# Patient Record
Sex: FEMALE
[De-identification: names, ages, dates, MRNs, and addresses within clinical notes are randomized per-mention and may not be internally consistent; named-entity substitution may affect disease eponyms.]

---

## 2022-10-31 ENCOUNTER — APPOINTMENT (OUTPATIENT)
Dept: ANTEPARTUM | Facility: CLINIC | Age: 32
End: 2022-10-31

## 2022-10-31 ENCOUNTER — ASOB RESULT (OUTPATIENT)
Age: 32
End: 2022-10-31

## 2022-10-31 PROBLEM — Z00.00 ENCOUNTER FOR PREVENTIVE HEALTH EXAMINATION: Status: ACTIVE | Noted: 2022-10-31

## 2022-10-31 PROCEDURE — 76813 OB US NUCHAL MEAS 1 GEST: CPT

## 2022-12-13 ENCOUNTER — APPOINTMENT (OUTPATIENT)
Dept: ANTEPARTUM | Facility: CLINIC | Age: 32
End: 2022-12-13

## 2022-12-13 ENCOUNTER — ASOB RESULT (OUTPATIENT)
Age: 32
End: 2022-12-13

## 2022-12-13 PROCEDURE — 76811 OB US DETAILED SNGL FETUS: CPT

## 2022-12-19 ENCOUNTER — APPOINTMENT (OUTPATIENT)
Dept: ANTEPARTUM | Facility: CLINIC | Age: 32
End: 2022-12-19

## 2023-02-17 ENCOUNTER — ASOB RESULT (OUTPATIENT)
Age: 33
End: 2023-02-17

## 2023-02-17 ENCOUNTER — APPOINTMENT (OUTPATIENT)
Dept: MATERNAL FETAL MEDICINE | Facility: CLINIC | Age: 33
End: 2023-02-17
Payer: COMMERCIAL

## 2023-02-17 DIAGNOSIS — O24.419 GESTATIONAL DIABETES MELLITUS IN PREGNANCY, UNSPECIFIED CONTROL: ICD-10-CM

## 2023-02-17 PROCEDURE — G0109 DIAB MANAGE TRN IND/GROUP: CPT | Mod: 95

## 2023-02-17 RX ORDER — LANCETS 33 GAUGE
EACH MISCELLANEOUS
Qty: 4 | Refills: 2 | Status: ACTIVE | COMMUNITY
Start: 2023-02-17 | End: 1900-01-01

## 2023-02-17 RX ORDER — BLOOD-GLUCOSE METER
KIT MISCELLANEOUS
Qty: 2 | Refills: 0 | Status: ACTIVE | COMMUNITY
Start: 2023-02-17 | End: 1900-01-01

## 2023-02-17 RX ORDER — BLOOD-GLUCOSE METER
W/DEVICE KIT MISCELLANEOUS
Qty: 1 | Refills: 0 | Status: ACTIVE | COMMUNITY
Start: 2023-02-17 | End: 1900-01-01

## 2023-02-17 RX ORDER — URINE ACETONE TEST STRIPS
STRIP MISCELLANEOUS
Qty: 1 | Refills: 2 | Status: ACTIVE | COMMUNITY
Start: 2023-02-17 | End: 1900-01-01

## 2023-02-23 ENCOUNTER — ASOB RESULT (OUTPATIENT)
Age: 33
End: 2023-02-23

## 2023-02-23 ENCOUNTER — APPOINTMENT (OUTPATIENT)
Dept: ANTEPARTUM | Facility: CLINIC | Age: 33
End: 2023-02-23
Payer: COMMERCIAL

## 2023-02-23 PROCEDURE — 76819 FETAL BIOPHYS PROFIL W/O NST: CPT | Mod: 59

## 2023-02-23 PROCEDURE — 76816 OB US FOLLOW-UP PER FETUS: CPT

## 2023-02-28 ENCOUNTER — ASOB RESULT (OUTPATIENT)
Age: 33
End: 2023-02-28

## 2023-02-28 ENCOUNTER — APPOINTMENT (OUTPATIENT)
Dept: MATERNAL FETAL MEDICINE | Facility: CLINIC | Age: 33
End: 2023-02-28
Payer: COMMERCIAL

## 2023-02-28 PROCEDURE — G0108 DIAB MANAGE TRN  PER INDIV: CPT | Mod: 95

## 2023-03-21 ENCOUNTER — TRANSCRIPTION ENCOUNTER (OUTPATIENT)
Age: 33
End: 2023-03-21

## 2023-03-21 ENCOUNTER — ASOB RESULT (OUTPATIENT)
Age: 33
End: 2023-03-21

## 2023-03-21 ENCOUNTER — APPOINTMENT (OUTPATIENT)
Dept: MATERNAL FETAL MEDICINE | Facility: CLINIC | Age: 33
End: 2023-03-21
Payer: COMMERCIAL

## 2023-03-21 PROCEDURE — G0108 DIAB MANAGE TRN  PER INDIV: CPT | Mod: 95

## 2023-03-24 ENCOUNTER — ASOB RESULT (OUTPATIENT)
Age: 33
End: 2023-03-24

## 2023-03-24 ENCOUNTER — APPOINTMENT (OUTPATIENT)
Dept: ANTEPARTUM | Facility: CLINIC | Age: 33
End: 2023-03-24
Payer: COMMERCIAL

## 2023-03-24 PROCEDURE — 76816 OB US FOLLOW-UP PER FETUS: CPT

## 2023-03-24 PROCEDURE — 76819 FETAL BIOPHYS PROFIL W/O NST: CPT | Mod: 59

## 2023-04-11 ENCOUNTER — APPOINTMENT (OUTPATIENT)
Dept: MATERNAL FETAL MEDICINE | Facility: CLINIC | Age: 33
End: 2023-04-11
Payer: COMMERCIAL

## 2023-04-11 ENCOUNTER — ASOB RESULT (OUTPATIENT)
Age: 33
End: 2023-04-11

## 2023-04-11 PROCEDURE — G0108 DIAB MANAGE TRN  PER INDIV: CPT | Mod: 95

## 2023-04-21 ENCOUNTER — APPOINTMENT (OUTPATIENT)
Dept: ANTEPARTUM | Facility: CLINIC | Age: 33
End: 2023-04-21
Payer: COMMERCIAL

## 2023-04-21 ENCOUNTER — ASOB RESULT (OUTPATIENT)
Age: 33
End: 2023-04-21

## 2023-04-21 PROCEDURE — 76816 OB US FOLLOW-UP PER FETUS: CPT

## 2023-05-03 ENCOUNTER — ASOB RESULT (OUTPATIENT)
Age: 33
End: 2023-05-03

## 2023-05-03 ENCOUNTER — APPOINTMENT (OUTPATIENT)
Dept: ANTEPARTUM | Facility: CLINIC | Age: 33
End: 2023-05-03
Payer: COMMERCIAL

## 2023-05-03 PROCEDURE — 76819 FETAL BIOPHYS PROFIL W/O NST: CPT

## 2023-05-04 ENCOUNTER — OUTPATIENT (OUTPATIENT)
Dept: OUTPATIENT SERVICES | Facility: HOSPITAL | Age: 33
LOS: 1 days | End: 2023-05-04
Payer: COMMERCIAL

## 2023-05-04 VITALS
TEMPERATURE: 98 F | HEART RATE: 83 BPM | DIASTOLIC BLOOD PRESSURE: 57 MMHG | SYSTOLIC BLOOD PRESSURE: 101 MMHG | RESPIRATION RATE: 18 BRPM

## 2023-05-04 DIAGNOSIS — O26.899 OTHER SPECIFIED PREGNANCY RELATED CONDITIONS, UNSPECIFIED TRIMESTER: ICD-10-CM

## 2023-05-04 PROCEDURE — 59025 FETAL NON-STRESS TEST: CPT

## 2023-05-04 PROCEDURE — 99212 OFFICE O/P EST SF 10 MIN: CPT

## 2023-05-04 PROCEDURE — G0463: CPT

## 2023-05-04 NOTE — OB PROVIDER TRIAGE NOTE - HISTORY OF PRESENT ILLNESS
OB Triage Note    32yo  @ 40w1d LMP 22,  JOSEPH 5/3/23 presents to triage c/o contractions q5min. Denies LOF or VB. +FM. Does not desire an epidural at this time. Last EFW  6lbs 14oz.        PNC: Dr. Narcisa CRAWLEY Issues: A1GDM, FS well controlled, last EFW as per above   PNL: GBS negative     OBHx:   GynHx: h/o ovarian cysts. Denies abnormal Paps, STIs, fibroids   PMH: denies  PSH: denies  Meds: PNV  Allergies: NKDA  Social: denies alcohol/tobacco/drug use in pregnancy   Psych: denies anxiety/depression     Will accept blood transfusions: Yes

## 2023-05-04 NOTE — OB PROVIDER TRIAGE NOTE - NS ATTEND AMEND GEN_ALL_CORE FT
patient presented for rule out labor  not kyaw on NST  patient dc'd home and to call back if contractions return or she has new symptoms    MARIA DE JESUS Bryant MD

## 2023-05-04 NOTE — OB RN TRIAGE NOTE - IN THE PAST 12 MONTHS HAVE YOU USED DRUGS OTHER THAN THOSE REQUIRED FOR MEDICAL REASON?
Case Management Discharge Note      Final Note: Home with O2 via [employer]         Selected Continued Care - Discharged on 4/9/2023 Admission date: 4/4/2023 - Discharge disposition: Home or Self Care    Destination    No services have been selected for the patient.              Durable Medical Equipment Coordination complete.    Service Provider Selected Services Address Phone Fax Patient Preferred    [facility] Durable Medical Equipment [address] [place] [id number] [phone] --          Dialysis/Infusion    No services have been selected for the patient.              Home Medical Care    No services have been selected for the patient.              Therapy    No services have been selected for the patient.              Community Resources    No services have been selected for the patient.              Community & DME    No services have been selected for the patient.                  Transportation Services  Private: Car    Final Discharge Disposition Code: 01 - home or self-care   No

## 2023-05-04 NOTE — OB PROVIDER TRIAGE NOTE - NSOBPROVIDERNOTE_OBGYN_ALL_OB_FT
32yo  @ 40w1d presenting to triage c/o contractions, not found to be in active labor, SVE 2/-3. Not kyaw on tocometer. Fetal status reassuring with reactive NST and  BPP. Maternal vitals stable. Ok to discharge home.     Patient to be discharged home.   Labor precautions discussed with pt, advised to return if LOF, ctxs, VB, decreased FM.   Follow-up with primary OB as scheduled.     Discussed with Dr. Bryant

## 2023-05-04 NOTE — OB RN TRIAGE NOTE - FALL HARM RISK - UNIVERSAL INTERVENTIONS
Bed in lowest position, wheels locked, appropriate side rails in place/Call bell, personal items and telephone in reach/Instruct patient to call for assistance before getting out of bed or chair/Non-slip footwear when patient is out of bed/[place] to call system/Physically safe environment - no spills, clutter or unnecessary equipment/Purposeful Proactive Rounding/Room/bathroom lighting operational, light cord in reach

## 2023-05-04 NOTE — OB PROVIDER TRIAGE NOTE - NSHPPHYSICALEXAM_GEN_ALL_CORE
Vital Signs Last 24 Hrs  T(C): 36.5 (05 May 2023 00:02), Max: 36.8 (04 May 2023 23:04)  T(F): 97.7 (05 May 2023 00:02), Max: 98.24 (04 May 2023 23:04)  HR: 67 (05 May 2023 00:02) (62 - 86)  BP: 103/61 (05 May 2023 00:02) (101/57 - 103/61)  RR: 18 (04 May 2023 23:10) (18 - 18)  SpO2: 97% (05 May 2023 00:01) (93% - 97%)    Gen: alert, NAD  Abd: gravid, soft, non-tender  Ext: wwp, no LE edema or pain bilaterally    SVE: 2/70/-3    EFM: baseline 150, mod variability, +accels, no decels  [place]: occ contractions, +irritability   BSUS: vertex, BPP 8/8, NELLY 11cm   EFW: 3500 by Leopolds Vital Signs Last 24 Hrs  T(C): 36.5 (05 May 2023 00:02), Max: 36.8 (04 May 2023 23:04)  T(F): 97.7 (05 May 2023 00:02), Max: 98.24 (04 May 2023 23:04)  HR: 67 (05 May 2023 00:02) (62 - 86)  BP: 103/61 (05 May 2023 00:02) (101/57 - 103/61)  RR: 18 (04 May 2023 23:10) (18 - 18)  SpO2: 97% (05 May 2023 00:01) (93% - 97%)    Gen: alert, NAD  Abd: gravid, soft, non-tender  Ext: wwp, no LE edema or pain bilaterally    SVE: 2/70/-2    EFM: baseline 150, mod variability, +accels, no decels  [place]: occ contractions, +irritability   BSUS: vertex, BPP 8/8, NELLY 11cm   EFW: 3500 by Leopolds

## 2023-05-05 ENCOUNTER — INPATIENT (INPATIENT)
Facility: HOSPITAL | Age: 33
LOS: 1 days | Discharge: HOME | End: 2023-05-07
Attending: STUDENT IN AN ORGANIZED HEALTH CARE EDUCATION/TRAINING PROGRAM | Admitting: STUDENT IN AN ORGANIZED HEALTH CARE EDUCATION/TRAINING PROGRAM
Payer: COMMERCIAL

## 2023-05-05 VITALS — HEART RATE: 67 BPM | TEMPERATURE: 98 F | SYSTOLIC BLOOD PRESSURE: 103 MMHG | DIASTOLIC BLOOD PRESSURE: 61 MMHG

## 2023-05-05 VITALS — SYSTOLIC BLOOD PRESSURE: 99 MMHG | HEART RATE: 78 BPM | DIASTOLIC BLOOD PRESSURE: 60 MMHG

## 2023-05-05 DIAGNOSIS — Z34.80 ENCOUNTER FOR SUPERVISION OF OTHER NORMAL PREGNANCY, UNSPECIFIED TRIMESTER: ICD-10-CM

## 2023-05-05 DIAGNOSIS — Z3A.40 40 WEEKS GESTATION OF PREGNANCY: ICD-10-CM

## 2023-05-05 DIAGNOSIS — O24.410 GESTATIONAL DIABETES MELLITUS IN PREGNANCY, DIET CONTROLLED: ICD-10-CM

## 2023-05-05 DIAGNOSIS — O48.0 POST-TERM PREGNANCY: ICD-10-CM

## 2023-05-05 DIAGNOSIS — O34.83 MATERNAL CARE FOR OTHER ABNORMALITIES OF PELVIC ORGANS, THIRD TRIMESTER: ICD-10-CM

## 2023-05-05 DIAGNOSIS — O47.1 FALSE LABOR AT OR AFTER 37 COMPLETED WEEKS OF GESTATION: ICD-10-CM

## 2023-05-05 DIAGNOSIS — N83.209 UNSPECIFIED OVARIAN CYST, UNSPECIFIED SIDE: ICD-10-CM

## 2023-05-05 DIAGNOSIS — O26.899 OTHER SPECIFIED PREGNANCY RELATED CONDITIONS, UNSPECIFIED TRIMESTER: ICD-10-CM

## 2023-05-05 LAB
BASOPHILS # BLD AUTO: 0.02 K/UL — SIGNIFICANT CHANGE UP (ref 0–0.2)
BASOPHILS NFR BLD AUTO: 0.2 % — SIGNIFICANT CHANGE UP (ref 0–2)
BLD GP AB SCN SERPL QL: NEGATIVE — SIGNIFICANT CHANGE UP
COVID-19 SPIKE DOMAIN AB INTERP: POSITIVE
COVID-19 SPIKE DOMAIN ANTIBODY RESULT: >250 U/ML — HIGH
EOSINOPHIL # BLD AUTO: 0.02 K/UL — SIGNIFICANT CHANGE UP (ref 0–0.5)
EOSINOPHIL NFR BLD AUTO: 0.2 % — SIGNIFICANT CHANGE UP (ref 0–6)
GLUCOSE BLDC GLUCOMTR-MCNC: 82 MG/DL — SIGNIFICANT CHANGE UP (ref 70–99)
GLUCOSE BLDC GLUCOMTR-MCNC: 84 MG/DL — SIGNIFICANT CHANGE UP (ref 70–99)
GLUCOSE BLDC GLUCOMTR-MCNC: 85 MG/DL — SIGNIFICANT CHANGE UP (ref 70–99)
GLUCOSE BLDC GLUCOMTR-MCNC: 87 MG/DL — SIGNIFICANT CHANGE UP (ref 70–99)
HBV SURFACE AG SERPL QL IA: SIGNIFICANT CHANGE UP
HCT VFR BLD CALC: 34.9 % — SIGNIFICANT CHANGE UP (ref 34.5–45)
HGB BLD-MCNC: 11.7 G/DL — SIGNIFICANT CHANGE UP (ref 11.5–15.5)
HIV 1 & 2 AB SERPL IA.RAPID: SIGNIFICANT CHANGE UP
IMM GRANULOCYTES NFR BLD AUTO: 0.8 % — SIGNIFICANT CHANGE UP (ref 0–0.9)
LYMPHOCYTES # BLD AUTO: 1.6 K/UL — SIGNIFICANT CHANGE UP (ref 1–3.3)
LYMPHOCYTES # BLD AUTO: 18.2 % — SIGNIFICANT CHANGE UP (ref 13–44)
MCHC RBC-ENTMCNC: 30.7 PG — SIGNIFICANT CHANGE UP (ref 27–34)
MCHC RBC-ENTMCNC: 33.5 GM/DL — SIGNIFICANT CHANGE UP (ref 32–36)
MCV RBC AUTO: 91.6 FL — SIGNIFICANT CHANGE UP (ref 80–100)
MONOCYTES # BLD AUTO: 0.54 K/UL — SIGNIFICANT CHANGE UP (ref 0–0.9)
MONOCYTES NFR BLD AUTO: 6.2 % — SIGNIFICANT CHANGE UP (ref 2–14)
NEUTROPHILS # BLD AUTO: 6.52 K/UL — SIGNIFICANT CHANGE UP (ref 1.8–7.4)
NEUTROPHILS NFR BLD AUTO: 74.4 % — SIGNIFICANT CHANGE UP (ref 43–77)
NRBC # BLD: 0 /100 WBCS — SIGNIFICANT CHANGE UP (ref 0–0)
PLATELET # BLD AUTO: 270 K/UL — SIGNIFICANT CHANGE UP (ref 150–400)
RBC # BLD: 3.81 M/UL — SIGNIFICANT CHANGE UP (ref 3.8–5.2)
RBC # FLD: 12.8 % — SIGNIFICANT CHANGE UP (ref 10.3–14.5)
RH IG SCN BLD-IMP: POSITIVE — SIGNIFICANT CHANGE UP
RH IG SCN BLD-IMP: POSITIVE — SIGNIFICANT CHANGE UP
SARS-COV-2 IGG+IGM SERPL QL IA: >250 U/ML — HIGH
SARS-COV-2 IGG+IGM SERPL QL IA: POSITIVE
T PALLIDUM AB TITR SER: NEGATIVE — SIGNIFICANT CHANGE UP
WBC # BLD: 8.77 K/UL — SIGNIFICANT CHANGE UP (ref 3.8–10.5)
WBC # FLD AUTO: 8.77 K/UL — SIGNIFICANT CHANGE UP (ref 3.8–10.5)

## 2023-05-05 RX ORDER — OXYCODONE HYDROCHLORIDE 5 MG/1
5 TABLET ORAL
Refills: 0 | Status: DISCONTINUED | OUTPATIENT
Start: 2023-05-05 | End: 2023-05-07

## 2023-05-05 RX ORDER — BENZOCAINE 10 %
1 GEL (GRAM) MUCOUS MEMBRANE EVERY 6 HOURS
Refills: 0 | Status: DISCONTINUED | OUTPATIENT
Start: 2023-05-05 | End: 2023-05-07

## 2023-05-05 RX ORDER — CHLORHEXIDINE GLUCONATE 213 G/1000ML
1 SOLUTION TOPICAL ONCE
Refills: 0 | Status: DISCONTINUED | OUTPATIENT
Start: 2023-05-05 | End: 2023-05-05

## 2023-05-05 RX ORDER — CITRIC ACID/SODIUM CITRATE 300-500 MG
15 SOLUTION, ORAL ORAL EVERY 6 HOURS
Refills: 0 | Status: DISCONTINUED | OUTPATIENT
Start: 2023-05-05 | End: 2023-05-05

## 2023-05-05 RX ORDER — HYDROCORTISONE 1 %
1 OINTMENT (GRAM) TOPICAL EVERY 6 HOURS
Refills: 0 | Status: DISCONTINUED | OUTPATIENT
Start: 2023-05-05 | End: 2023-05-07

## 2023-05-05 RX ORDER — AER TRAVELER 0.5 G/1
1 SOLUTION RECTAL; TOPICAL EVERY 4 HOURS
Refills: 0 | Status: DISCONTINUED | OUTPATIENT
Start: 2023-05-05 | End: 2023-05-07

## 2023-05-05 RX ORDER — PRAMOXINE HYDROCHLORIDE 150 MG/15G
1 AEROSOL, FOAM RECTAL EVERY 4 HOURS
Refills: 0 | Status: DISCONTINUED | OUTPATIENT
Start: 2023-05-05 | End: 2023-05-07

## 2023-05-05 RX ORDER — DIPHENHYDRAMINE HCL 50 MG
25 CAPSULE ORAL EVERY 6 HOURS
Refills: 0 | Status: DISCONTINUED | OUTPATIENT
Start: 2023-05-05 | End: 2023-05-07

## 2023-05-05 RX ORDER — MAGNESIUM HYDROXIDE 400 MG/1
30 TABLET, CHEWABLE ORAL
Refills: 0 | Status: DISCONTINUED | OUTPATIENT
Start: 2023-05-05 | End: 2023-05-07

## 2023-05-05 RX ORDER — KETOROLAC TROMETHAMINE 30 MG/ML
30 SYRINGE (ML) INJECTION ONCE
Refills: 0 | Status: DISCONTINUED | OUTPATIENT
Start: 2023-05-05 | End: 2023-05-06

## 2023-05-05 RX ORDER — DIBUCAINE 1 %
1 OINTMENT (GRAM) RECTAL EVERY 6 HOURS
Refills: 0 | Status: DISCONTINUED | OUTPATIENT
Start: 2023-05-05 | End: 2023-05-07

## 2023-05-05 RX ORDER — SODIUM CHLORIDE 9 MG/ML
1000 INJECTION INTRAMUSCULAR; INTRAVENOUS; SUBCUTANEOUS
Refills: 0 | Status: DISCONTINUED | OUTPATIENT
Start: 2023-05-05 | End: 2023-05-05

## 2023-05-05 RX ORDER — TETANUS TOXOID, REDUCED DIPHTHERIA TOXOID AND ACELLULAR PERTUSSIS VACCINE, ADSORBED 5; 2.5; 8; 8; 2.5 [IU]/.5ML; [IU]/.5ML; UG/.5ML; UG/.5ML; UG/.5ML
0.5 SUSPENSION INTRAMUSCULAR ONCE
Refills: 0 | Status: DISCONTINUED | OUTPATIENT
Start: 2023-05-05 | End: 2023-05-07

## 2023-05-05 RX ORDER — OXYTOCIN 10 UNIT/ML
333.33 VIAL (ML) INJECTION
Qty: 20 | Refills: 0 | Status: COMPLETED | OUTPATIENT
Start: 2023-05-05 | End: 2023-05-05

## 2023-05-05 RX ORDER — OXYCODONE HYDROCHLORIDE 5 MG/1
5 TABLET ORAL ONCE
Refills: 0 | Status: DISCONTINUED | OUTPATIENT
Start: 2023-05-05 | End: 2023-05-07

## 2023-05-05 RX ORDER — SODIUM CHLORIDE 9 MG/ML
3 INJECTION INTRAMUSCULAR; INTRAVENOUS; SUBCUTANEOUS EVERY 8 HOURS
Refills: 0 | Status: DISCONTINUED | OUTPATIENT
Start: 2023-05-05 | End: 2023-05-07

## 2023-05-05 RX ORDER — LANOLIN
1 OINTMENT (GRAM) TOPICAL EVERY 6 HOURS
Refills: 0 | Status: DISCONTINUED | OUTPATIENT
Start: 2023-05-05 | End: 2023-05-07

## 2023-05-05 RX ORDER — IBUPROFEN 200 MG
600 TABLET ORAL EVERY 6 HOURS
Refills: 0 | Status: COMPLETED | OUTPATIENT
Start: 2023-05-05 | End: 2024-04-02

## 2023-05-05 RX ORDER — OXYTOCIN 10 UNIT/ML
4 VIAL (ML) INJECTION
Qty: 30 | Refills: 0 | Status: DISCONTINUED | OUTPATIENT
Start: 2023-05-05 | End: 2023-05-05

## 2023-05-05 RX ORDER — OXYTOCIN 10 UNIT/ML
41.67 VIAL (ML) INJECTION
Qty: 20 | Refills: 0 | Status: DISCONTINUED | OUTPATIENT
Start: 2023-05-05 | End: 2023-05-07

## 2023-05-05 RX ORDER — SIMETHICONE 80 MG/1
80 TABLET, CHEWABLE ORAL EVERY 4 HOURS
Refills: 0 | Status: DISCONTINUED | OUTPATIENT
Start: 2023-05-05 | End: 2023-05-07

## 2023-05-05 RX ORDER — ACETAMINOPHEN 500 MG
975 TABLET ORAL
Refills: 0 | Status: DISCONTINUED | OUTPATIENT
Start: 2023-05-05 | End: 2023-05-07

## 2023-05-05 RX ORDER — SODIUM CHLORIDE 9 MG/ML
1000 INJECTION, SOLUTION INTRAVENOUS
Refills: 0 | Status: DISCONTINUED | OUTPATIENT
Start: 2023-05-05 | End: 2023-05-05

## 2023-05-05 RX ADMIN — Medication 4 MILLIUNIT(S)/MIN: at 16:57

## 2023-05-05 NOTE — OB PROVIDER H&P - PROBLEM SELECTOR PLAN 1
Admit  Routine labs  IV access and IV fluids  Finger sticks per routine.  Anesthesia consult.  Continous efm/toco  Expectant .  d/w Dr. Brewster.  GUMARO Serrano

## 2023-05-05 NOTE — OB PROVIDER LABOR PROGRESS NOTE - NS_SUBJECTIVE/OBJECTIVE_OBGYN_ALL_OB_FT
Patient comfortable with epidural.
Pt comfortable with epidural  T(C): 36.5 (05-05-23 @ 14:07), Max: 36.8 (05-04-23 @ 23:04)  HR: 60 (05-05-23 @ 16:14) (51 - 86)  BP: 93/61 (05-05-23 @ 16:14) (93/61 - 116/74)  RR: 18 (05-05-23 @ 14:07) (16 - 18)  SpO2: 99% (05-05-23 @ 16:14) (82% - 100%)

## 2023-05-05 NOTE — OB PROVIDER LABOR PROGRESS NOTE - ASSESSMENT
A/P: Pt is a  at 40+ weeks admitted in labor.   - Pitocin at 8 mu/min   - AROM for clear fluid now   - Will place with peanut ball   - Epidural in place   - Will reassess in 2-3 hours or sooner prn 
34yo  @ 40w2d in labor  will augment with Pitocin  Ernestina Lovett PAC

## 2023-05-05 NOTE — OB PROVIDER H&P - NSHPPHYSICALEXAM_GEN_ALL_CORE
ICU Vital Signs Last 24 Hrs  T(C): 36.5 (05 May 2023 12:56), Max: 36.8 (04 May 2023 23:04)  T(F): 97.7 (05 May 2023 12:56), Max: 98.24 (04 May 2023 23:04)  HR: 78 (05 May 2023 12:56) (62 - 86)  BP: 99/60 (05 May 2023 12:56) (99/60 - 103/61)  RR: 18 (05 May 2023 12:56) (16 - 18)  SpO2: 97% (05 May 2023 00:01) (93% - 97%)    O2 Parameters below as of 05 May 2023 12:56  Patient On (Oxygen Delivery Method): room air    gen: NAD   cards: clear S1S2 RRR  pulm: CTA b/l  abd: soft, gravid. nontender.  ve: 6/90/-1

## 2023-05-05 NOTE — OB RN PATIENT PROFILE - FALL HARM RISK - UNIVERSAL INTERVENTIONS
Bed in lowest position, wheels locked, appropriate side rails in place/Call bell, personal items and telephone in reach/Instruct patient to call for assistance before getting out of bed or chair/Non-slip footwear when patient is out of bed/[place] to call system/Physically safe environment - no spills, clutter or unnecessary equipment/Purposeful Proactive Rounding/Room/bathroom lighting operational, light cord in reach

## 2023-05-05 NOTE — OB PROVIDER H&P - NSLOWPPHRISK_OBGYN_A_OB
No previous uterine incision/[place] Pregnancy/Less than or equal to 4 previous vaginal births/No known bleeding disorder/No history of postpartum hemorrhage/No other PPH risks indicated

## 2023-05-05 NOTE — OB PROVIDER H&P - HISTORY OF PRESENT ILLNESS
32 y/o  michele 5/3/23 @ 40.2 wks ga presenting w// painful contractions every 5 minutes which has worsened overnight.  +FM. Denies vb/lof.  GBS neg.   EFW 6uo17aq x 2 weeks ago.   VE last night 2/70%/-2.   PNC c/b GDMA1 well controlled.     all: nkda  meds: pnv            Adderall pre-pregnancy    pmhx: ADHD   ob: current  gyn: denies  surg: denies  fhx: denies  soc: denies x 3.

## 2023-05-06 ENCOUNTER — TRANSCRIPTION ENCOUNTER (OUTPATIENT)
Age: 33
End: 2023-05-06

## 2023-05-06 DIAGNOSIS — Z37.9 OUTCOME OF DELIVERY, UNSPECIFIED: ICD-10-CM

## 2023-05-06 LAB
RUBV IGG SER-ACNC: 1.3 INDEX — SIGNIFICANT CHANGE UP
RUBV IGG SER-IMP: POSITIVE — SIGNIFICANT CHANGE UP

## 2023-05-06 RX ORDER — LIDOCAINE 4 G/100G
1 CREAM TOPICAL ONCE
Refills: 0 | Status: COMPLETED | OUTPATIENT
Start: 2023-05-06 | End: 2023-05-06

## 2023-05-06 RX ORDER — IBUPROFEN 200 MG
600 TABLET ORAL EVERY 6 HOURS
Refills: 0 | Status: DISCONTINUED | OUTPATIENT
Start: 2023-05-06 | End: 2023-05-07

## 2023-05-06 RX ORDER — ACETAMINOPHEN 500 MG
3 TABLET ORAL
Qty: 0 | Refills: 0 | DISCHARGE
Start: 2023-05-06

## 2023-05-06 RX ORDER — BENZOCAINE 10 %
1 GEL (GRAM) MUCOUS MEMBRANE
Qty: 0 | Refills: 0 | DISCHARGE
Start: 2023-05-06

## 2023-05-06 RX ORDER — AER TRAVELER 0.5 G/1
1 SOLUTION RECTAL; TOPICAL
Qty: 0 | Refills: 0 | DISCHARGE
Start: 2023-05-06

## 2023-05-06 RX ORDER — SIMETHICONE 80 MG/1
1 TABLET, CHEWABLE ORAL
Qty: 0 | Refills: 0 | DISCHARGE
Start: 2023-05-06

## 2023-05-06 RX ORDER — LANOLIN
1 OINTMENT (GRAM) TOPICAL
Qty: 0 | Refills: 0 | DISCHARGE
Start: 2023-05-06

## 2023-05-06 RX ORDER — IBUPROFEN 200 MG
1 TABLET ORAL
Qty: 0 | Refills: 0 | DISCHARGE
Start: 2023-05-06

## 2023-05-06 RX ADMIN — Medication 975 MILLIGRAM(S): at 17:52

## 2023-05-06 RX ADMIN — Medication 30 MILLIGRAM(S): at 01:40

## 2023-05-06 RX ADMIN — Medication 975 MILLIGRAM(S): at 06:43

## 2023-05-06 RX ADMIN — LIDOCAINE 1 PATCH: 4 CREAM TOPICAL at 22:15

## 2023-05-06 RX ADMIN — Medication 600 MILLIGRAM(S): at 21:00

## 2023-05-06 RX ADMIN — Medication 975 MILLIGRAM(S): at 18:22

## 2023-05-06 RX ADMIN — Medication 600 MILLIGRAM(S): at 10:10

## 2023-05-06 RX ADMIN — Medication 975 MILLIGRAM(S): at 06:11

## 2023-05-06 RX ADMIN — Medication 600 MILLIGRAM(S): at 15:00

## 2023-05-06 RX ADMIN — Medication 600 MILLIGRAM(S): at 15:30

## 2023-05-06 RX ADMIN — Medication 600 MILLIGRAM(S): at 09:40

## 2023-05-06 RX ADMIN — SODIUM CHLORIDE 3 MILLILITER(S): 9 INJECTION INTRAMUSCULAR; INTRAVENOUS; SUBCUTANEOUS at 06:44

## 2023-05-06 RX ADMIN — Medication 600 MILLIGRAM(S): at 21:30

## 2023-05-06 RX ADMIN — Medication 1000 MILLIUNIT(S)/MIN: at 01:29

## 2023-05-06 RX ADMIN — Medication 1 TABLET(S): at 15:06

## 2023-05-06 NOTE — PROGRESS NOTE ADULT - PROBLEM SELECTOR PLAN 1
#Possible Coccyx fracture  Ice Packs  consider x-ray    #Postpartum  Increase OOB  Regular diet  PO Pain protocol  Routine Postpartum Care

## 2023-05-06 NOTE — DISCHARGE NOTE OB - PATIENT PORTAL LINK FT
You can access the FollowMyHealth Patient Portal offered by [facility] by registering at the following website: [email or web address]. By joining [employer]’s FollowMyHealth portal, you will also be able to view your health information using other applications (apps) compatible with our system.

## 2023-05-06 NOTE — PROGRESS NOTE ADULT - ATTENDING COMMENTS
I personally saw and evaluated patient and agree with GUMARO Lovett's assessment and plan.  Pt is overall doing well after VAVD.    Only complaint is some pain at her coccyx. Pt has been placing ice packs and pillow in the area and the pain is improving  Bleeding is mild to moderate  Will continue routine PP care  If coccyx pain worsen to please let us know.    NINO Martinez

## 2023-05-06 NOTE — DISCHARGE NOTE OB - CARE PROVIDER_API CALL
Emiliana Brewster (DO)  Obstetrics and Gynecology  [address] [facility], [address]  [place] [address]  Phone: [phone]  Fax: [phone]  Follow Up Time:

## 2023-05-06 NOTE — OB PROVIDER DELIVERY SUMMARY - NSSELHIDDEN_OBGYN_ALL_OB_FT
[NS_DeliveryAttending1_OBGYN_ALL_OB_FT:[id number]],[NS_DeliveryRN_OBGYN_ALL_OB_FT:[id number]]

## 2023-05-06 NOTE — DISCHARGE NOTE OB - CARE PLAN
1 Principal Discharge DX:	Vacuum-assisted vaginal delivery  Assessment and plan of treatment:	Diet and activities as discussed and tolerated.  Please call office to schedule postpartum appointment 5 weeks after delivery or earlier if needed.  Please call office with any questions or concerns.

## 2023-05-06 NOTE — OB PROVIDER DELIVERY SUMMARY - NSPROVIDERDELIVERYNOTE_OBGYN_ALL_OB_FT
FHT with deep variable decels after pushing. Vacuum assisted vaginal delivery of liveborn infant from PRATEEK position. Head, shoulders, and body delivered easily. Infant was suctioned. No mec. Delayed cord clamping. Cord gases obtained. Placenta delivered intact. Fundal massage was given and uterine fundus was found to be atonic. Bimanual massage given. Uterine cavity was clear and uterus became firm. Vaginal exam revealed an intact cervix, vaginal walls and sulci. Patient had a 2nd degree laceration in the perineum that was repaired with 2.0 and 3.0 vicryl suture. Excellent hemostasis was noted. Patient was stable. Count was correct x 2.

## 2023-05-06 NOTE — OB RN DELIVERY SUMMARY - NS_SEPSISRSKCALC_OBGYN_ALL_OB_FT
EOS calculated successfully. EOS Risk Factor: 0.5/1000 live births ([facility] national incidence); GA=40w2d; Temp=98.24; ROM=4.283; GBS='Negative'; Antibiotics='No antibiotics or any antibiotics < 2 hrs prior to birth'   EOS calculated successfully. EOS Risk Factor: 0.5/1000 live births ([facility] national incidence); GA=40w2d; Temp=99; ROM=4.283; GBS='Negative'; Antibiotics='No antibiotics or any antibiotics < 2 hrs prior to birth'

## 2023-05-06 NOTE — DISCHARGE NOTE OB - MEDICATION SUMMARY - MEDICATIONS TO TAKE
I will START or STAY ON the medications listed below when I get home from the hospital:    ibuprofen 600 mg oral tablet  -- 1 tab(s) by mouth every 6 hours  -- Indication: For pain    acetaminophen 325 mg oral tablet  -- 3 tab(s) by mouth every 6 hours  -- Indication: For pain    benzocaine 20% topical spray  -- 1 Apply on skin to affected area every 6 hours As needed for Perineal discomfort  -- Indication: For perineal discomfort    lanolin topical ointment  -- 1 Apply on skin to affected area every 6 hours As needed nipple soreness  -- Indication: For Nipple soreness    witch hazel 50% topical pad  -- 1 Apply on skin to affected area every 4 hours As needed Perineal discomfort  -- Indication: For perineal discomfort    Prenatal Multivitamins with Folic Acid 1 mg oral tablet  -- 1 tab(s) by mouth once a day  -- Indication: For postpartum    simethicone 80 mg oral tablet, chewable  -- 1 tab(s) by mouth every 4 hours As needed Gas  -- Indication: For gas pain

## 2023-05-06 NOTE — OB RN DELIVERY SUMMARY - NSSELHIDDEN_OBGYN_ALL_OB_FT
[NS_DeliveryAttending1_OBGYN_ALL_OB_FT:[id number]],[NS_DeliveryRN_OBGYN_ALL_OB_FT:[id number]] [NS_DeliveryAttending1_OBGYN_ALL_OB_FT:[id number]],[NS_DeliveryRN_OBGYN_ALL_OB_FT:[id number]],[NS_DeliveryAttending2_OBGYN_ALL_OB_FT:[id number]]

## 2023-05-06 NOTE — DISCHARGE NOTE OB - PLAN OF CARE
Diet and activities as discussed and tolerated.  Please call office to schedule postpartum appointment 5 weeks after delivery or earlier if needed.  Please call office with any questions or concerns.

## 2023-05-06 NOTE — OB NEONATOLOGY/PEDIATRICIAN DELIVERY SUMMARY - NSPEDSNEONOTESA_OBGYN_ALL_OB_FT
Attended Delivery Note (Pediatrics/Neonatology):  Requested to attend vacuum assisted vaginal delivery due to NRFHR.  Mother is a 32 yo  at 40.2 weeks of gestation. Prenatal labs negative/NR/immune. Maternal Blood Type O+, GBS negative from .   Maternal PMHx: Ovarian Cyst. Prenatal Care uncomplicated.  AROM at 1940 (~4.5 hours prior to delivery), clear fluid.  Delivered via vacuum assisted vaginal delivery, Vertex presentation. Emerged vigorous. Delayed cord clamping for 60 seconds. Warmed, dried, stimulated and suctioned. APGAR 9/9 . Maternal Tmax 36.8'C. EOS 0.08  Mother wants breast feeding, does not desire HepB vaccine.

## 2023-05-06 NOTE — CHART NOTE - NSCHARTNOTEFT_GEN_A_CORE
Patient seen for: nutrition consult for GDM postpartum education prior to discharge    Source: patient, EMR    Patient is: ; GDM [A1]    Chart reviewed, events noted. Meds/Labs reviewed.    Anthropometrics:  Height: 65 inches  Pre-pregnancy weight: 135 pounds   Weight gain: 34 pounds   Admit/Dosing wt: 169.9 pounds     Nutrition Diagnosis:   Food and Nutrition Related Knowledge Deficit related to limited previous exposure to postpartum GDM nutrition education and recommendations as evidenced by GDM postpartum.    Goal: Pt to state at least two teach back points.    Intervention:  1. Education: Pt educated on postpartum dietary recommendations, including risk of development of T2DM; reinforced importance of DM screening 4-12 weeks postpartum. Reviewed nutrition recommendations for breast feeding, including adequate protein, calorie, and fluid intake.   2. Handout: "What to expect now that you have had your baby"     Monitoring:  No other nutrition risks were identified during this encounter.  RD remains available upon request and will follow up per protocol.  NUBIA Chung [facility] #[phone] or TEAMS

## 2023-05-06 NOTE — DISCHARGE NOTE OB - HOSPITAL COURSE
Pt underwent a vacuum assisted vaginal delivery.  Please refer to delivery summary for details.   Pt did well postpartum.  She was voiding, ambulating and tolerating regular diet.  Her pain is well controlled and she remained afebrile.  Pt was discharged in stable condition and will follow up in office for her postpartum appointment.

## 2023-05-07 VITALS
RESPIRATION RATE: 18 BRPM | DIASTOLIC BLOOD PRESSURE: 53 MMHG | HEART RATE: 64 BPM | SYSTOLIC BLOOD PRESSURE: 91 MMHG | OXYGEN SATURATION: 98 % | TEMPERATURE: 98 F

## 2023-05-07 PROCEDURE — 36415 COLL VENOUS BLD VENIPUNCTURE: CPT

## 2023-05-07 PROCEDURE — 86762 RUBELLA ANTIBODY: CPT

## 2023-05-07 PROCEDURE — 86850 RBC ANTIBODY SCREEN: CPT

## 2023-05-07 PROCEDURE — 86901 BLOOD TYPING SEROLOGIC RH(D): CPT

## 2023-05-07 PROCEDURE — 72170 X-RAY EXAM OF PELVIS: CPT | Mod: 26

## 2023-05-07 PROCEDURE — 86780 TREPONEMA PALLIDUM: CPT

## 2023-05-07 PROCEDURE — 86769 SARS-COV-2 COVID-19 ANTIBODY: CPT

## 2023-05-07 PROCEDURE — 72170 X-RAY EXAM OF PELVIS: CPT

## 2023-05-07 PROCEDURE — 87340 HEPATITIS B SURFACE AG IA: CPT

## 2023-05-07 PROCEDURE — 85025 COMPLETE CBC W/AUTO DIFF WBC: CPT

## 2023-05-07 PROCEDURE — 86900 BLOOD TYPING SEROLOGIC ABO: CPT

## 2023-05-07 PROCEDURE — 86703 HIV-1/HIV-2 1 RESULT ANTBDY: CPT

## 2023-05-07 PROCEDURE — 82962 GLUCOSE BLOOD TEST: CPT

## 2023-05-07 PROCEDURE — 59050 FETAL MONITOR W/REPORT: CPT

## 2023-05-07 RX ADMIN — Medication 975 MILLIGRAM(S): at 00:05

## 2023-05-07 RX ADMIN — Medication 975 MILLIGRAM(S): at 14:15

## 2023-05-07 RX ADMIN — Medication 600 MILLIGRAM(S): at 10:30

## 2023-05-07 RX ADMIN — Medication 975 MILLIGRAM(S): at 06:00

## 2023-05-07 RX ADMIN — Medication 600 MILLIGRAM(S): at 09:33

## 2023-05-07 RX ADMIN — Medication 600 MILLIGRAM(S): at 02:40

## 2023-05-07 RX ADMIN — Medication 975 MILLIGRAM(S): at 00:35

## 2023-05-07 RX ADMIN — Medication 600 MILLIGRAM(S): at 03:15

## 2023-05-07 RX ADMIN — Medication 975 MILLIGRAM(S): at 13:15

## 2023-05-07 RX ADMIN — Medication 1 TABLET(S): at 13:15

## 2023-05-07 RX ADMIN — LIDOCAINE 1 PATCH: 4 CREAM TOPICAL at 11:00

## 2023-05-07 RX ADMIN — Medication 600 MILLIGRAM(S): at 14:53

## 2023-05-07 NOTE — PROGRESS NOTE ADULT - SUBJECTIVE AND OBJECTIVE BOX
OB Attg Note:  PPD 2    S: Patient doing well. Minimal lochia.  Pt continues to c/o localized pain at the area of her coccyx.  It is most tender when she is sitting of laying down with direct pressure in the area.  She is able to ambulate w/o difficulty.  She denies any LE weakness.     O: Vital Signs Last 24 Hrs  T(C): 36.7 (07 May 2023 05:32), Max: 36.9 (06 May 2023 13:15)  T(F): 98.1 (07 May 2023 05:32), Max: 98.4 (06 May 2023 13:15)  HR: 64 (07 May 2023 05:32) (64 - 72)  BP: 91/53 (07 May 2023 05:32) (91/53 - 100/61)  BP(mean): --  RR: 18 (07 May 2023 05:32) (18 - 18)  SpO2: 98% (07 May 2023 05:32) (97% - 99%)    Parameters below as of 07 May 2023 05:32  Patient On (Oxygen Delivery Method): room air        Gen: NAD  Abd: soft, NT, ND, fundus firm below umbilicus  Lochia: moderate  Ext: no tenderness  On inspection (pt had lidocaine patch on which I peeled off temporarily for exam).  There is no skin changes, erythema or abnormal bulging of the area.  Pt reports some tenderness with pressure and the pain is localized only to her coccyx.  The pain does not radiate and it is most pronounced with pressure     Labs:                        11.7   8.77  )-----------( 270      ( 05 May 2023 13:34 )             34.9       A: 33y PPD#2 s/p  doing well but with coccyx pain    Plan:
Postpartum Note- PPD#1    Prenatal Labs  Blood type: O Positive  Rubella IgG: unknown  RPR: Negative        S:Patient c/o "tail bone" pain. Pt states she felt a pop during delivery.    Pt is OOB, tolerating PO, voiding. Lochia WNL.     O:  Vital Signs Last 24 Hrs  T(C): 36.6 (06 May 2023 05:50), Max: 37.2 (06 May 2023 00:35)  T(F): 97.8 (06 May 2023 05:50), Max: 99 (06 May 2023 00:35)  HR: 61 (06 May 2023 05:50) (51 - 105)  BP: 120/73 (06 May 2023 05:50) (86/54 - 186/85)  BP(mean): 64 (06 May 2023 04:05) (64 - 74)  RR: 18 (06 May 2023 05:50) (16 - 18)  SpO2: 100% (06 May 2023 05:50) (82% - 100%)    Parameters below as of 06 May 2023 05:50  Patient On (Oxygen Delivery Method): room air         Gen: NAD  Abdomen: Soft, nontender, non-distended, fundus firm.   to palpation at top of buttucks, no erythema or edema  Vaginal: Lochia WNL    Ext:  Neg calf tenderness    LABS:    Hemoglobin: 11.7 g/dL (05-05 @ 13:34)      Hematocrit: 34.9 % (05-05 @ 13:34)                
appropriate for situation

## 2023-05-07 NOTE — PROGRESS NOTE ADULT - ASSESSMENT
A/P:  33y  PPD # 1   S/P   VAVD with 2nd degree  laceration   Doing Well
Will obtain pelvic x-ray to r/o coccyx fracture  Pt feels lidocaine patch did not really alleviate her pain but ice packs did will continue ice packs at this time  Reassured pt that pain medication including oxycodone is safe to use with breast feeding.  Will await results of x-ray to determine care plan  Explained to pt and her  that pain is likely musculoskeletal in origin given it is very localized and worsens with direct pressure.  Most likely will continue conservative mgmt with pain control and ice/heat to area.  Pt and  expressed that all their questions were fully answered and verbalized understanding of above plan.  will continue routine PP care  Discharge planning pending x-ray results as well as pt's pain control.    NINO Martinez

## 2023-05-08 LAB — GLUCOSE BLDC GLUCOMTR-MCNC: 72 MG/DL — SIGNIFICANT CHANGE UP (ref 70–99)

## 2023-06-26 NOTE — DISCHARGE NOTE OB - REASON FOR ADMISSION
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

## 2025-03-21 PROBLEM — Z78.9 OTHER SPECIFIED HEALTH STATUS: Chronic | Status: ACTIVE | Noted: 2023-05-05

## 2025-03-31 NOTE — OB RN PATIENT PROFILE - FALL HARM RISK - FACTORS NURSING JUDGEMENT
Tammy Addison A Der Triage Nurse Msg Pool22 minutes ago (8:17 AM)     Please advise on rescheduling ALEX        Ellen Holliday  P Derm Mf Recept Msg Pool (supporting Ana Quijano MD)21 hours ago (10:41 AM)     Appointment Request From: Ellen Holliday     With Provider: Ana Quijano MD [[facility]]     Preferred Date Range: 4/21/2025 - 5/26/2025     Preferred Times: Any Time     Reason for visit: Specialist Follow-Up     Comments:  I had an appointment with the doctor and I think she was out sick so they canceled. Need to see her. Thanks    No

## 2025-05-25 ENCOUNTER — INPATIENT (INPATIENT)
Facility: HOSPITAL | Age: 35
LOS: 0 days | Discharge: HOME | DRG: 951 | End: 2025-05-26
Attending: OBSTETRICS & GYNECOLOGY | Admitting: OBSTETRICS & GYNECOLOGY
Payer: COMMERCIAL

## 2025-05-25 VITALS — DIASTOLIC BLOOD PRESSURE: 66 MMHG | HEART RATE: 72 BPM | TEMPERATURE: 98 F | SYSTOLIC BLOOD PRESSURE: 109 MMHG

## 2025-05-25 DIAGNOSIS — Z34.80 ENCOUNTER FOR SUPERVISION OF OTHER NORMAL PREGNANCY, UNSPECIFIED TRIMESTER: ICD-10-CM

## 2025-05-25 DIAGNOSIS — O26.899 OTHER SPECIFIED PREGNANCY RELATED CONDITIONS, UNSPECIFIED TRIMESTER: ICD-10-CM

## 2025-05-25 LAB
BASOPHILS # BLD AUTO: 0.05 K/UL — SIGNIFICANT CHANGE UP (ref 0–0.2)
BASOPHILS NFR BLD AUTO: 0.5 % — SIGNIFICANT CHANGE UP (ref 0–2)
BLD GP AB SCN SERPL QL: NEGATIVE — SIGNIFICANT CHANGE UP
EOSINOPHIL # BLD AUTO: 0.02 K/UL — SIGNIFICANT CHANGE UP (ref 0–0.5)
EOSINOPHIL NFR BLD AUTO: 0.2 % — SIGNIFICANT CHANGE UP (ref 0–6)
HCT VFR BLD CALC: 37.1 % — SIGNIFICANT CHANGE UP (ref 34.5–45)
HGB BLD-MCNC: 12.7 G/DL — SIGNIFICANT CHANGE UP (ref 11.5–15.5)
IMM GRANULOCYTES NFR BLD AUTO: 1.1 % — HIGH (ref 0–0.9)
LYMPHOCYTES # BLD AUTO: 2.2 K/UL — SIGNIFICANT CHANGE UP (ref 1–3.3)
LYMPHOCYTES # BLD AUTO: 21.4 % — SIGNIFICANT CHANGE UP (ref 13–44)
MCHC RBC-ENTMCNC: 31.9 PG — SIGNIFICANT CHANGE UP (ref 27–34)
MCHC RBC-ENTMCNC: 34.2 G/DL — SIGNIFICANT CHANGE UP (ref 32–36)
MCV RBC AUTO: 93.2 FL — SIGNIFICANT CHANGE UP (ref 80–100)
MONOCYTES # BLD AUTO: 0.48 K/UL — SIGNIFICANT CHANGE UP (ref 0–0.9)
MONOCYTES NFR BLD AUTO: 4.7 % — SIGNIFICANT CHANGE UP (ref 2–14)
NEUTROPHILS # BLD AUTO: 7.43 K/UL — HIGH (ref 1.8–7.4)
NEUTROPHILS NFR BLD AUTO: 72.1 % — SIGNIFICANT CHANGE UP (ref 43–77)
NRBC BLD AUTO-RTO: 0 /100 WBCS — SIGNIFICANT CHANGE UP (ref 0–0)
PLATELET # BLD AUTO: 287 K/UL — SIGNIFICANT CHANGE UP (ref 150–400)
RBC # BLD: 3.98 M/UL — SIGNIFICANT CHANGE UP (ref 3.8–5.2)
RBC # FLD: 13 % — SIGNIFICANT CHANGE UP (ref 10.3–14.5)
RH IG SCN BLD-IMP: POSITIVE — SIGNIFICANT CHANGE UP
WBC # BLD: 10.29 K/UL — SIGNIFICANT CHANGE UP (ref 3.8–10.5)
WBC # FLD AUTO: 10.29 K/UL — SIGNIFICANT CHANGE UP (ref 3.8–10.5)

## 2025-05-25 RX ORDER — IBUPROFEN 200 MG
600 TABLET ORAL EVERY 6 HOURS
Refills: 0 | Status: COMPLETED | OUTPATIENT
Start: 2025-05-25 | End: 2026-04-23

## 2025-05-25 RX ORDER — ACETAMINOPHEN 500 MG/5ML
975 LIQUID (ML) ORAL
Refills: 0 | Status: DISCONTINUED | OUTPATIENT
Start: 2025-05-25 | End: 2025-05-26

## 2025-05-25 RX ORDER — SIMETHICONE 80 MG
80 TABLET,CHEWABLE ORAL EVERY 4 HOURS
Refills: 0 | Status: DISCONTINUED | OUTPATIENT
Start: 2025-05-25 | End: 2025-05-26

## 2025-05-25 RX ORDER — OXYTOCIN-SODIUM CHLORIDE 0.9% IV SOLN 30 UNIT/500ML 30-0.9/5 UT/ML-%
167 SOLUTION INTRAVENOUS
Qty: 30 | Refills: 0 | Status: DISCONTINUED | OUTPATIENT
Start: 2025-05-25 | End: 2025-05-26

## 2025-05-25 RX ORDER — OXYCODONE HYDROCHLORIDE 30 MG/1
5 TABLET ORAL ONCE
Refills: 0 | Status: DISCONTINUED | OUTPATIENT
Start: 2025-05-25 | End: 2025-05-26

## 2025-05-25 RX ORDER — DIBUCAINE 10 MG/G
1 OINTMENT TOPICAL EVERY 6 HOURS
Refills: 0 | Status: DISCONTINUED | OUTPATIENT
Start: 2025-05-25 | End: 2025-05-26

## 2025-05-25 RX ORDER — IBUPROFEN 200 MG
600 TABLET ORAL EVERY 6 HOURS
Refills: 0 | Status: DISCONTINUED | OUTPATIENT
Start: 2025-05-25 | End: 2025-05-26

## 2025-05-25 RX ORDER — MODIFIED LANOLIN 100 %
1 CREAM (GRAM) TOPICAL EVERY 6 HOURS
Refills: 0 | Status: DISCONTINUED | OUTPATIENT
Start: 2025-05-25 | End: 2025-05-26

## 2025-05-25 RX ORDER — CITRIC ACID/SODIUM CITRATE 300-500 MG
15 SOLUTION, ORAL ORAL EVERY 6 HOURS
Refills: 0 | Status: DISCONTINUED | OUTPATIENT
Start: 2025-05-25 | End: 2025-05-25

## 2025-05-25 RX ORDER — PRENATAL 136/IRON/FOLIC ACID 27 MG-1 MG
1 TABLET ORAL DAILY
Refills: 0 | Status: DISCONTINUED | OUTPATIENT
Start: 2025-05-25 | End: 2025-05-26

## 2025-05-25 RX ORDER — OXYTOCIN-SODIUM CHLORIDE 0.9% IV SOLN 30 UNIT/500ML 30-0.9/5 UT/ML-%
4 SOLUTION INTRAVENOUS
Qty: 30 | Refills: 0 | Status: DISCONTINUED | OUTPATIENT
Start: 2025-05-25 | End: 2025-05-25

## 2025-05-25 RX ORDER — DIPHENHYDRAMINE HCL 12.5MG/5ML
25 ELIXIR ORAL EVERY 6 HOURS
Refills: 0 | Status: DISCONTINUED | OUTPATIENT
Start: 2025-05-25 | End: 2025-05-26

## 2025-05-25 RX ORDER — WITCH HAZEL LEAF
1 FLUID EXTRACT MISCELLANEOUS EVERY 4 HOURS
Refills: 0 | Status: DISCONTINUED | OUTPATIENT
Start: 2025-05-25 | End: 2025-05-26

## 2025-05-25 RX ORDER — MAGNESIUM HYDROXIDE 400 MG/5ML
30 SUSPENSION ORAL
Refills: 0 | Status: DISCONTINUED | OUTPATIENT
Start: 2025-05-25 | End: 2025-05-26

## 2025-05-25 RX ORDER — SODIUM CHLORIDE 9 G/1000ML
1000 INJECTION, SOLUTION INTRAVENOUS
Refills: 0 | Status: DISCONTINUED | OUTPATIENT
Start: 2025-05-25 | End: 2025-05-25

## 2025-05-25 RX ORDER — HYDROCORTISONE 10 MG/G
1 CREAM TOPICAL EVERY 6 HOURS
Refills: 0 | Status: DISCONTINUED | OUTPATIENT
Start: 2025-05-25 | End: 2025-05-26

## 2025-05-25 RX ORDER — OXYCODONE HYDROCHLORIDE 30 MG/1
5 TABLET ORAL
Refills: 0 | Status: DISCONTINUED | OUTPATIENT
Start: 2025-05-25 | End: 2025-05-26

## 2025-05-25 RX ORDER — CLOSTRIDIUM TETANI TOXOID ANTIGEN (FORMALDEHYDE INACTIVATED), CORYNEBACTERIUM DIPHTHERIAE TOXOID ANTIGEN (FORMALDEHYDE INACTIVATED), BORDETELLA PERTUSSIS TOXOID ANTIGEN (GLUTARALDEHYDE INACTIVATED), BORDETELLA PERTUSSIS FILAMENTOUS HEMAGGLUTININ ANTIGEN (FORMALDEHYDE INACTIVATED), BORDETELLA PERTUSSIS PERTACTIN ANTIGEN, AND BORDETELLA PERTUSSIS FIMBRIAE 2/3 ANTIGEN 5; 2; 2.5; 5; 3; 5 [LF]/.5ML; [LF]/.5ML; UG/.5ML; UG/.5ML; UG/.5ML; UG/.5ML
0.5 INJECTION, SUSPENSION INTRAMUSCULAR ONCE
Refills: 0 | Status: DISCONTINUED | OUTPATIENT
Start: 2025-05-25 | End: 2025-05-26

## 2025-05-25 RX ORDER — PRAMOXINE HCL 1 %
1 GEL (GRAM) TOPICAL EVERY 4 HOURS
Refills: 0 | Status: DISCONTINUED | OUTPATIENT
Start: 2025-05-25 | End: 2025-05-26

## 2025-05-25 RX ORDER — BENZOCAINE 220 MG/G
1 SPRAY, METERED PERIODONTAL EVERY 6 HOURS
Refills: 0 | Status: DISCONTINUED | OUTPATIENT
Start: 2025-05-25 | End: 2025-05-26

## 2025-05-25 RX ORDER — KETOROLAC TROMETHAMINE 30 MG/ML
30 INJECTION, SOLUTION INTRAMUSCULAR; INTRAVENOUS ONCE
Refills: 0 | Status: DISCONTINUED | OUTPATIENT
Start: 2025-05-25 | End: 2025-05-25

## 2025-05-25 RX ADMIN — Medication 600 MILLIGRAM(S): at 21:00

## 2025-05-25 RX ADMIN — OXYTOCIN-SODIUM CHLORIDE 0.9% IV SOLN 30 UNIT/500ML 4 MILLIUNIT(S)/MIN: 30-0.9/5 SOLUTION at 10:13

## 2025-05-25 RX ADMIN — KETOROLAC TROMETHAMINE 30 MILLIGRAM(S): 30 INJECTION, SOLUTION INTRAMUSCULAR; INTRAVENOUS at 13:09

## 2025-05-25 RX ADMIN — Medication 600 MILLIGRAM(S): at 20:20

## 2025-05-25 RX ADMIN — Medication 975 MILLIGRAM(S): at 17:28

## 2025-05-25 RX ADMIN — Medication 975 MILLIGRAM(S): at 18:00

## 2025-05-25 NOTE — OB RN DELIVERY SUMMARY - NS_SEPSISRSKCALC_OBGYN_ALL_OB_FT
EOS calculated successfully. EOS Risk Factor: 0.5/1000 live births ([facility] national incidence); GA=40w3d; Temp=98.78; ROM=0.683; GBS='Negative'; Antibiotics='No antibiotics or any antibiotics < 2 hrs prior to birth'

## 2025-05-25 NOTE — OB RN DELIVERY SUMMARY - NSSELHIDDEN_OBGYN_ALL_OB_FT
[NS_DeliveryAttending1_OBGYN_ALL_OB_FT:[id number]],[NS_DeliveryRN_OBGYN_ALL_OB_FT:[id number]]

## 2025-05-25 NOTE — OB RN TRIAGE NOTE - FALL HARM RISK - UNIVERSAL INTERVENTIONS
Bed in lowest position, wheels locked, appropriate side rails in place/Call bell, personal items and telephone in reach/Instruct patient to call for assistance before getting out of bed or chair/Non-slip footwear when patient is out of bed/[place] to call system/Physically safe environment - no spills, clutter or unnecessary equipment/Purposeful Proactive Rounding/Room/bathroom lighting operational, light cord in reach

## 2025-05-25 NOTE — OB PROVIDER LABOR PROGRESS NOTE - ASSESSMENT
Approved for epidural. Anesthesia made aware   Continue expectant management   DW: Dr. Vicente Kelly, PGY 4

## 2025-05-25 NOTE — OB RN TRIAGE NOTE - FALL HARM RISK - FALLEN IN PAST
DO NOT take any Tylenol (Acetaminophen) or narcotics containing Tylenol until after 5pm. You received Tylenol during your operation and it can cause damage to your liver if too much is taken within a 24 hour time period. No

## 2025-05-25 NOTE — OB RN DELIVERY SUMMARY - NS_BREASTINHOURA_OBGYN_ALL_OB
Simple: Patient demonstrates quick and easy understanding/Returned Demonstration
Offered, feeding was successful

## 2025-05-25 NOTE — OB RN DELIVERY SUMMARY - NS_EXTRAMURALDEL_OBGYN_ALL_OB
-Please get ASAP ENT apt -Please get records from [facility] in [place] and have them translated STAT -Please ensure pt has Cantonese  on subsequent visits -RTC in 3 weeks for MD visit with Dr. Khanna
No

## 2025-05-25 NOTE — OB PROVIDER H&P - HISTORY OF PRESENT ILLNESS
Labor and Delivery Admission H&P    Subjective    HPI: 34 yo F  at 40w3d presents for possible ROM and possible labor. Reports contractions began at 2:45am today. Coming irregularly and rates 4/10 pain. Reports large gush of clear fluid at 5am. Has not had further fluid leakage since then. Reports good fetal movement.  Denies vaginal bleeding. Pt denies any other concerns.    GBS negative  EFW 3500g extrapolated from sono at 36weeks per pt report     PNC: Denies prenatal issues.   ObHx:    - 2023 VAVD for Cat2 FHT, 7#10   GynHx: Denies hx of fibroids, ovarian cysts, abnml PAP smears, STIs  MedHx: Denies hx of HTN, DM, asthma, thyroid problems, blood clots/bleeding problems, hx of blood transfusions.  Meds: PNV  All: NKDA  PSHx: Denies  FHx: Denies hx of blood clots/bleeding problems  Social: Denies alcohol/tobacco/drug use in pregnancy  Psych: Denies hx of anxiety/depression     – Will accept blood transfusions? Yes

## 2025-05-25 NOTE — OB PROVIDER DELIVERY SUMMARY - NSPROVIDERDELIVERYNOTE_OBGYN_ALL_OB_FT
F in OA presentation. Clear fluid with no nuchal cord. Spont delivery of tinact placenta. Head and shoulders delivered easily.1st degree lac repaired. Uterine atony improved with TR cytotec and massage.

## 2025-05-25 NOTE — PRE-ANESTHESIA EVALUATION ADULT - BMI (KG/M2)
Pathologic Diagnosis   Date Value Ref Range Status   12/12/2024   Final    Skin, right lateral anterior chest, shave removal:   -Basal cell carcinoma, nodular type, extremely close to base of specimen         Called patient to review biopsy results, no answer. Voicemail left with clinic number for patient to return call.      28.3

## 2025-05-25 NOTE — OB PROVIDER DELIVERY SUMMARY - NSSELHIDDEN_OBGYN_ALL_OB_FT
Detail Level: Generalized General Sunscreen Counseling: I recommended a broad spectrum sunscreen with a SPF of 50 or higher. Sunscreens should be applied at least 15 minutes prior to expected sun exposure and then every 2 hours after that as long as sun exposure continues. If swimming or exercising, sunscreen should be reapplied every 45 minutes to an hour after getting wet or sweating.  One ounce, or the equivalent of a shot glass full of sunscreen, is adequate to protect the skin not covered by a bathing suit. I also recommended a lip balm with a sunscreen as well. Sun protective clothing can be used in lieu of sunscreen but must be worn the entire time you are exposed to the sun's rays. [NS_DeliveryAttending1_OBGYN_ALL_OB_FT:[id number]]

## 2025-05-25 NOTE — OB PROVIDER H&P - NSHPPHYSICALEXAM_GEN_ALL_CORE
Objective  – VS  T(C): 36.7 (05-25-25 @ 09:09)  HR: 65 (05-25-25 @ 09:17)  BP: 109/66 (05-25-25 @ 09:09)  RR: 16 (05-25-25 @ 09:09)  SpO2: 93% (05-25-25 @ 09:17)      – PE:   General: NAD   CV: RRR  Pulm: breathing comfortably on RA  Abd: gravid, nontender  Extr: moving all extremities with ease    – Cervical exam: 3.5/80/-3  – FHT: baseline 135, mod variability, +accels, -decels  – [place]: q8min   – Sono: vertex

## 2025-05-25 NOTE — OB PROVIDER H&P - ASSESSMENT
Assessment  34 yo F   @40w2d  presents for early labor.     Plan  - Admit to L+D. Routine Labs. IVF.  - Augmentation of labor w/ pitocin   - Fetus: cat 1 tracing. VTX.   - Prenatal issues: none  - GBS negative   - Pain: IV pain meds/epidural PRN    Plan per Dr. Vicente Hernandez, PGY1

## 2025-05-26 ENCOUNTER — TRANSCRIPTION ENCOUNTER (OUTPATIENT)
Age: 35
End: 2025-05-26

## 2025-05-26 VITALS
TEMPERATURE: 98 F | RESPIRATION RATE: 18 BRPM | HEART RATE: 58 BPM | DIASTOLIC BLOOD PRESSURE: 61 MMHG | OXYGEN SATURATION: 97 % | SYSTOLIC BLOOD PRESSURE: 100 MMHG

## 2025-05-26 LAB — T PALLIDUM AB TITR SER: NEGATIVE — SIGNIFICANT CHANGE UP

## 2025-05-26 PROCEDURE — 86850 RBC ANTIBODY SCREEN: CPT

## 2025-05-26 PROCEDURE — 86780 TREPONEMA PALLIDUM: CPT

## 2025-05-26 PROCEDURE — 86901 BLOOD TYPING SEROLOGIC RH(D): CPT

## 2025-05-26 PROCEDURE — 36415 COLL VENOUS BLD VENIPUNCTURE: CPT

## 2025-05-26 PROCEDURE — 85025 COMPLETE CBC W/AUTO DIFF WBC: CPT

## 2025-05-26 PROCEDURE — 86900 BLOOD TYPING SEROLOGIC ABO: CPT

## 2025-05-26 RX ORDER — SIMETHICONE 80 MG
1 TABLET,CHEWABLE ORAL
Qty: 0 | Refills: 0 | DISCHARGE
Start: 2025-05-26

## 2025-05-26 RX ORDER — IBUPROFEN 200 MG
1 TABLET ORAL
Qty: 0 | Refills: 0 | DISCHARGE
Start: 2025-05-26

## 2025-05-26 RX ORDER — ACETAMINOPHEN 500 MG/5ML
3 LIQUID (ML) ORAL
Qty: 0 | Refills: 0 | DISCHARGE
Start: 2025-05-26

## 2025-05-26 RX ADMIN — Medication 600 MILLIGRAM(S): at 02:36

## 2025-05-26 RX ADMIN — Medication 975 MILLIGRAM(S): at 11:58

## 2025-05-26 RX ADMIN — Medication 975 MILLIGRAM(S): at 00:02

## 2025-05-26 RX ADMIN — Medication 600 MILLIGRAM(S): at 08:54

## 2025-05-26 RX ADMIN — Medication 975 MILLIGRAM(S): at 06:30

## 2025-05-26 RX ADMIN — Medication 1 TABLET(S): at 11:57

## 2025-05-26 NOTE — DISCHARGE NOTE OB - PATIENT PORTAL LINK FT
You can access the FollowMyHealth Patient Portal offered by [facility] by registering at the following website: [email or web address]. By joining [facility]’s FollowMyHealth portal, you will also be able to view your health information using other applications (apps) compatible with our system.

## 2025-05-26 NOTE — PROGRESS NOTE ADULT - SUBJECTIVE AND OBJECTIVE BOX
OB Progress Note:  PPD#1    S: 34yo  PPD#1 s/p . Patient feels well. Pain is well controlled. She is tolerating a regular diet and passing flatus. She is voiding spontaneously, and ambulating without difficulty. Denies CP/SOB. Denies lightheadedness/dizziness. Denies N/V.    O:  Vitals:  Vital Signs Last 24 Hrs  T(C): 37.1 (25 May 2025 21:09), Max: 37.1 (25 May 2025 11:40)  T(F): 98.7 (25 May 2025 21:09), Max: 98.78 (25 May 2025 11:40)  HR: 57 (25 May 2025 21:09) (55 - 83)  BP: 95/57 (25 May 2025 21:09) (93/50 - 116/56)  BP(mean): --  RR: 18 (25 May 2025 21:09) (16 - 18)  SpO2: 97% (25 May 2025 21:09) (80% - 100%)    Parameters below as of 25 May 2025 21:09  Patient On (Oxygen Delivery Method): room air        MEDICATIONS  (STANDING):  acetaminophen     Tablet .. 975 milliGRAM(s) Oral <User Schedule>  diphtheria/tetanus/pertussis (acellular) Vaccine (Adacel) 0.5 milliLiter(s) IntraMuscular once  ibuprofen  Tablet. 600 milliGRAM(s) Oral every 6 hours  oxytocin Infusion 167 milliUNIT(s)/Min (167 mL/Hr) IV Continuous <Continuous>  oxytocin Infusion 167 milliUNIT(s)/Min (167 mL/Hr) IV Continuous <Continuous>  prenatal multivitamin 1 Tablet(s) Oral daily  sodium chloride 0.9% lock flush 3 milliLiter(s) IV Push every 8 hours    MEDICATIONS  (PRN):  benzocaine 20%/menthol 0.5% Spray 1 Spray(s) Topical every 6 hours PRN for Perineal discomfort  dibucaine 1% Ointment 1 Application(s) Topical every 6 hours PRN Perineal discomfort  diphenhydrAMINE 25 milliGRAM(s) Oral every 6 hours PRN Pruritus  hydrocortisone 1% Cream 1 Application(s) Topical every 6 hours PRN Moderate Pain (4-6)  lanolin Ointment 1 Application(s) Topical every 6 hours PRN nipple soreness  magnesium hydroxide Suspension 30 milliLiter(s) Oral two times a day PRN Constipation  oxyCODONE    IR 5 milliGRAM(s) Oral every 3 hours PRN Moderate to Severe Pain (4-10)  oxyCODONE    IR 5 milliGRAM(s) Oral once PRN Moderate to Severe Pain (4-10)  pramoxine 1%/zinc 5% Cream 1 Application(s) Topical every 4 hours PRN Moderate Pain (4-6)  simethicone 80 milliGRAM(s) Chew every 4 hours PRN Gas  witch hazel Pads 1 Application(s) Topical every 4 hours PRN Perineal discomfort      Labs:  Blood type: O Positive  Rubella IgG: RPR:                           12.7   10.29 >-----------< 287    (  @ 09:53 )             37.1                  Physical Exam:  General: NAD  Abdomen: soft, non-tender, non-distended, fundus firm  Vaginal: Lochia wnl  Extremities: No erythema/edema

## 2025-05-26 NOTE — DISCHARGE NOTE OB - FINANCIAL ASSISTANCE
[facility] provides services at a reduced cost to those who are determined to be eligible through [facility]’s financial assistance program. Information regarding [facility]’s financial assistance program can be found by going to [email or web address] or by calling [phone].

## 2025-05-26 NOTE — DISCHARGE NOTE OB - MATERIALS PROVIDED
Breastfeeding Log/Guide to Postpartum Care/Discharge Medication Information for Patients and Families Pocket Guide

## 2025-05-26 NOTE — DISCHARGE NOTE OB - CARE PLAN
1 Principal Discharge DX:	Spontaneous vaginal delivery   Principal Discharge DX:	Spontaneous vaginal delivery  Assessment and plan of treatment:	After discharge, please stay on pelvic rest for 6 weeks, meaning no sexual intercourse, no tampons and no douching.  No driving for 2 weeks as women can loose a lot of blood during delivery and there is a possibility of being lightheaded/fainting.  No lifting objects heavier than baby for two weeks.  Expect to have vaginal bleeding/spotting for up to six weeks.  The bleeding should get lighter and more white/light brown with time.  For bleeding soaking more than a pad an hour or passing clots greater than the size of your fist, come in to the emergency department.    Follow up in clinic in 6 weeks.

## 2025-05-26 NOTE — PROGRESS NOTE ADULT - ASSESSMENT
A/P: 36yo PPD#1 s/p .  Patient is stable and doing well post-partum.     #postpartum  - Hct: 37.1  - Pain well controlled, continue current pain regimen  - Increase ambulation, SCDs when not ambulating  - Continue regular diet    A Sacks, PGY1    INCOMPLETE!  A/P: 34yo PPD#1 s/p .  Patient is stable and doing well post-partum.     #postpartum  - Hct: 37.1  - Pain well controlled, continue current pain regimen  - Increase ambulation, SCDs when not ambulating  - Continue regular diet    A Sacks, PGY1

## 2025-05-26 NOTE — DISCHARGE NOTE OB - CARE PROVIDER_API CALL
Bull Zhang  Obstetrics and Gynecology  [address] [place] [address]  Phone: [phone]  Fax: [phone]  Follow Up Time:

## 2025-05-26 NOTE — DISCHARGE NOTE OB - MEDICATION SUMMARY - MEDICATIONS TO TAKE
I will START or STAY ON the medications listed below when I get home from the hospital:    ibuprofen 600 mg oral tablet  -- 1 tab(s) by mouth every 6 hours  -- Indication: For pain    acetaminophen 325 mg oral tablet  -- 3 tab(s) by mouth every 6 hours as needed for  moderate pain  -- Indication: For pain    simethicone 80 mg oral tablet, chewable  -- 1 tab(s) by mouth every 4 hours As needed Gas  -- Indication: For gas